# Patient Record
Sex: FEMALE | Race: WHITE | Employment: UNEMPLOYED | URBAN - METROPOLITAN AREA
[De-identification: names, ages, dates, MRNs, and addresses within clinical notes are randomized per-mention and may not be internally consistent; named-entity substitution may affect disease eponyms.]

---

## 2019-01-29 PROCEDURE — 87624 HPV HI-RISK TYP POOLED RSLT: CPT | Performed by: PHYSICIAN ASSISTANT

## 2019-01-29 PROCEDURE — 88175 CYTOPATH C/V AUTO FLUID REDO: CPT | Performed by: PHYSICIAN ASSISTANT

## 2025-04-24 RX ORDER — METOPROLOL SUCCINATE 25 MG/1
25 TABLET, EXTENDED RELEASE ORAL DAILY
Status: ON HOLD | COMMUNITY
End: 2025-04-30

## 2025-04-24 RX ORDER — LOSARTAN POTASSIUM 25 MG/1
25 TABLET ORAL DAILY
COMMUNITY

## 2025-04-24 NOTE — PAT NURSING NOTE
Per PAT encounter/MyChart message sent to pt/took notes as well in case doesn't cross into [place]:    PreOp Instructions     You are scheduled for: a Cardiac Procedure     Date of Procedure: 04/30/25 Wednesday     Diet Instructions: Do not eat or drink anything after midnight including gum, mints, candy, etc.     Medications: Medications you are allowed to take can be taken with a sip of water the morning of your procedure; Take Aspirin 81 mg x 4 tablets OR Aspirin 325 mg x 1 tablet the day of your procedure.      Medications to Stop: Hold herbal supplements and vitamins     Skin Prep : Shower with antibacterial soap using a clean washcloth, prior to procedure. Once dried off, no lotions/powders/creams/ointments, etc., Do not shave the procedure area, this will be completed at the hospital during the preparation phase.     Arrival Time: The day prior to your procedure (Tuesday) you will receive a phone call before 6:00 pm with your arrival time. If you haven't received a phone call, please check your voicemail messages., If you did not receive a voice mail and it is after 6:00 pm, please call the nursing supervisor at [phone].    Driving After Procedure: Sedation will be given so you WILL NOT be able to drive home. You will need a responsible adult  to drive you home. You can NOT take uber or taxi unless approved by your physician in advance.     Discharge Teaching: Your nurse will give you specific instructions before discharge, Most people can resume normal activities in 2-3 days, Any questions, please call the physician's office     Discharge Teaching (Overnight): We recommend you bring an overnight bag that you leave in the car the day of your procedure in case you need to stay overnight. We do not have lockers to lock up belongings so your  would have to hold onto bag while you are in the procedure. Examples would be chargers for electronics you need, toiletries, change of clothes, etc.      Booster parking is available starting at 6 am or park in the [place] parking garage at [facility]. Check in at the [facility] reception desk. Our  will be there to check you in for your procedure. Please bring your insurance cards and ID with you.                                                                                                                                      Please DO NOT respond to this message, the inbasket is not monitored for messages. For any questions, please call the physician's office.

## 2025-04-24 NOTE — H&P
[facility]   Cardiology Consultation    Reason for consult: Abnormal echo     HPI:   Initial visit 2025:  Katie Mabry is a very nice 54 year old female who presents for evaluation of abnormal echo showing LVEF 30%. Sick for 4 weeks in Dec 2024, SOB, asthma, headaches, treated for PNA at the time. There are no reports of chest pain, palpitations, leg swelling, orthopnea, PND, lightheadedness, syncope, claudication, stroke/TIA symptoms, or any outward signs of bleeding.     HISTORY:  Past Medical History:   Diagnosis Date   Extrinsic asthma, unspecified     Past Surgical History:   Procedure Laterality Date   BENIGN BIOPSY LEFT    benign excisional biopsy        Family History:   There is no family history of premature coronary artery disease or sudden cardiac death.   family history includes High Blood Pressure in her father; High Cholesterol in her father.   Social History:   reports that she has never smoked. She has never been exposed to tobacco smoke. She has never used smokeless tobacco. She reports that she does not drink alcohol and does not use drugs.     Allergies:    Cephalosporins RASH    Current Meds:  Current Outpatient Medications   Medication Sig Dispense Refill   Respiratory Therapy Supplies (NEBULIZER/TUBING/MOUTHPIECE) Does not apply Kit Use as needed with nebulizer solution. 1 kit 0   budesonide 0.25 MG/2ML Inhalation Suspension Take 2 mL (0.25 mg total) by nebulization 2 (two) times daily as needed (cough, wheezing). 30 mL 0   ciprofloxacin-hydrocortisone (CIPRO HC) 0.2-1 % Otic Suspension Place 3 drops into the left ear 2 (two) times daily. 3 gtts bid x 7 days 10 mL 0   VITAMIN D-VITAMIN K OR Take 1 tablet by mouth daily.   MAGNESIUM OR Take 1 tablet by mouth daily.       ROS:   As per HPI, otherwise 10 point ROS is negative in detail.     PHYSICAL EXAM:   /80  Pulse 120  Ht 5' 2\" (1.575 m)  Wt 220 lb (99.8 kg)  LMP 2023 (Approximate)  SpO2 96%   BMI 40.24 kg/m²     General: Awake and alert; in no acute distress  HEENT: Extraocular movements are intact; sclerae are anicteric  Neck: supple, no JVD, carotids brisk and equal  Cardiac: Regular rate and regular rhythm; normal S1 and S2, no murmurs, no rubs or gallops  Lungs: Clear to auscultation bilaterally; no rales, wheezing, rhonci; no accessory muscle use is noted  Abdomen: Soft, nondistended, nontender  Extremities: Warm, no clubbing or cyanosis; no edema  Psychiatric: Normal mood and affect; answers questions appropriately  Dermatologic: No rashes; normal skin turgor  Neurologic: No focal deficits    LABORATORY VALUES:  Cholesterol, Total (mg/dL)   Date Value   12/26/2013 122     Cholesterol (mg/dL)   Date Value   03/15/2025 146     HDL Cholesterol (mg/dL)   Date Value   12/26/2013 41     Direct HDL (mg/dL)   Date Value   03/15/2025 43     LDL Cholesterol Calc (mg/dL)   Date Value   12/26/2013 56     Calculated LDL (mg/dL)   Date Value   03/15/2025 71     Triglycerides (mg/dL)   Date Value   12/26/2013 123     AST (SGOT) (IU/L)   Date Value   12/26/2013 21     AST (U/L)   Date Value   03/15/2025 17     ALT (SGPT) (IU/L)   Date Value   12/26/2013 22     ALT (U/L)   Date Value   03/15/2025 21     Blood Urea Nitrogen (mg/dL)   Date Value   03/15/2025 14.0   12/26/2013 10     Creatinine, Serum (mg/dL)   Date Value   12/26/2013 0.60     Creatinine (mg/dL)   Date Value   03/15/2025 0.64     Cardiovascular diagnostics     Echo EF 25-30%  EKG Sinus tach 102 bpm, poss LAE, borderline ECG     ASSESSMENT:   Cardiomyopathy - LVEF 25-30%. No s/s HF at this time.   Viral syndrome 12/2024    PLAN:   Discussed test results and options for management including medical therapy alone or coronary angiography, either with CT or cardiac catheterization. Given severely low EF, we have mutually agreed to proceed with cardiac catheterization and coronary angiography for definitive evaluation.  Start metoprolol, losartan and  introduce further GDMT as able  If LVEF remains < 36% will need ICD. Consider Lifevest pending cath LV angio in the next few days.     Informed consent  Risks and benefits of procedure were discussed with patient. Airway examined. Patient is ASA class 2 and Mallampati class 2. Pt is appropriate for conscious sedation. No history of difficult airway.    The risks, benefits, indications and alternatives of left heart catheterization and coronary angigoraphy with possible percutaneous coronary intervention were discussed. The risks include, but are not limited to: bleeding, anemia requiring blood transfusion, allergic reaction, hypotension, infection, vascular injury, injury to upper or lower extremity requiring endovascular or open surgical repair, kidney failure, stroke, cardiac or pulmonary artery perforation, pericardial effusion and tamponade requiring pericardiocentesis, myocardial infarction (heart attack), respiratory failure needing intubation, cardiac arrest, need for emergent surgery, and death. Benefits of the procedure include: symptomatic improvement, diagnosis of coronary artery disease or other forms of heart disease and possibly prevention of myocardial infarction. Alternatives to the procedure include: not performing cardiac catheterization, treatment with medications only, and observation. The patient and any accompanying family have considered the above, all questions have been answered to the best of my ability to their satisfaction, and have decided to proceed with the procedure.     Appropriate candidate for Sedation/Analgesia: Yes  Plan for Sedation reviewed: Yes   Explained Anesthesia options and attendant risks, and have determined patient is an appropriate candidate. Yes  Consent for Sedation obtained: Yes  Patient reevaluated immediately prior to Sedation/Analgesia: Yes     Patient is signed up for MyChart and agrees to follow up through this for results.   RTC post cath    60 minutes spent  reviewing chart, history, patient symptoms, underlying risk factors, medical conditions, diagnosis, formulation of treatment plan, and initiation of medical therapy.    Ken Elizabeth MD  Interventional Cardiology  [facility]  Office: [phone]

## 2025-04-30 ENCOUNTER — HOSPITAL ENCOUNTER (OUTPATIENT)
Dept: INTERVENTIONAL RADIOLOGY/VASCULAR | Facility: HOSPITAL | Age: 55
Discharge: HOME | End: 2025-04-30
Attending: INTERNAL MEDICINE | Admitting: INTERNAL MEDICINE
Payer: COMMERCIAL

## 2025-04-30 VITALS
WEIGHT: 220 LBS | DIASTOLIC BLOOD PRESSURE: 85 MMHG | HEART RATE: 90 BPM | TEMPERATURE: 98 F | SYSTOLIC BLOOD PRESSURE: 129 MMHG | HEIGHT: 62 IN | BODY MASS INDEX: 40.48 KG/M2 | RESPIRATION RATE: 15 BRPM | OXYGEN SATURATION: 97 %

## 2025-04-30 DIAGNOSIS — R93.1 ABNORMAL ECHOCARDIOGRAM: ICD-10-CM

## 2025-04-30 PROCEDURE — 99152 MOD SED SAME PHYS/QHP 5/>YRS: CPT | Performed by: INTERNAL MEDICINE

## 2025-04-30 PROCEDURE — 93458 L HRT ARTERY/VENTRICLE ANGIO: CPT | Performed by: INTERNAL MEDICINE

## 2025-04-30 RX ORDER — VERAPAMIL HYDROCHLORIDE 2.5 MG/ML
INJECTION INTRAVENOUS
Status: COMPLETED
Start: 2025-04-30 | End: 2025-04-30

## 2025-04-30 RX ORDER — METOPROLOL SUCCINATE 50 MG/1
50 TABLET, EXTENDED RELEASE ORAL DAILY
Qty: 90 TABLET | Refills: 1 | Status: SHIPPED | OUTPATIENT
Start: 2025-04-30

## 2025-04-30 RX ORDER — HEPARIN SODIUM 5000 [USP'U]/ML
INJECTION, SOLUTION INTRAVENOUS; SUBCUTANEOUS
Status: COMPLETED
Start: 2025-04-30 | End: 2025-04-30

## 2025-04-30 RX ORDER — LIDOCAINE HYDROCHLORIDE 10 MG/ML
INJECTION, SOLUTION EPIDURAL; INFILTRATION; INTRACAUDAL; PERINEURAL
Status: COMPLETED
Start: 2025-04-30 | End: 2025-04-30

## 2025-04-30 RX ORDER — SODIUM CHLORIDE 9 MG/ML
INJECTION, SOLUTION INTRAVENOUS
Status: DISCONTINUED | OUTPATIENT
Start: 2025-05-01 | End: 2025-04-30 | Stop reason: HOSPADM

## 2025-04-30 RX ORDER — ASPIRIN 81 MG/1
324 TABLET ORAL ONCE
COMMUNITY

## 2025-04-30 RX ORDER — IOPAMIDOL 755 MG/ML
100 INJECTION, SOLUTION INTRAVASCULAR
Status: COMPLETED | OUTPATIENT
Start: 2025-04-30 | End: 2025-04-30

## 2025-04-30 RX ORDER — MIDAZOLAM HYDROCHLORIDE 1 MG/ML
INJECTION INTRAMUSCULAR; INTRAVENOUS
Status: COMPLETED
Start: 2025-04-30 | End: 2025-04-30

## 2025-04-30 RX ORDER — NITROGLYCERIN 20 MG/100ML
INJECTION INTRAVENOUS
Status: COMPLETED
Start: 2025-04-30 | End: 2025-04-30

## 2025-04-30 RX ADMIN — IOPAMIDOL 65 ML: 755 INJECTION, SOLUTION INTRAVASCULAR at 14:12:00

## 2025-04-30 NOTE — DISCHARGE INSTRUCTIONS
HOME CARE INSTRUCTIONS FOLLOWING CORONARY ANGIOGRAPHY,  PERIPHERAL ANGIOGRAPHY, ANGIOPLASTY (PTCA/PTA) OR INSERTION  OF STENT IN THE CORONARY, CAROTID, AND/OR PERIPHERAL ARTERIES      Activity:   DO NOT drive after the procedure. You may resume driving late the following day according to the nurse or physician’s instructions   Plan on resting and relaxing tonight and tomorrow   Resume your normal activity after 48 hours, or as instructed by your physician   Do not lift anything over 10 pounds for the next 24 hours   Avoid sexual activity for the next 24 hours   Avoid drinking alcohol for the next 24 hours   If the wrist was used, avoid bending/flexing of the wrist for the next 24 hours.       What is Normal?   A small lump at the procedure site associated with mild tenderness when touched   The procedure site may be bruised or discolored   There may be a small amount of drainage on the bandage    Special Instructions:   Drink plenty of fluids during the next 24 hours to “flush” the contrast from your system   The bandage is to remain in place for 24 hours   Keep the bandage clean and dry   DO NOT submerge the procedure site for 72 hours (no bath tubs or pools). This includes dishwashing/submersion of the wrist, if the wrist was used   After 24 hours, you must remove the bandage   You should shower after removing the bandage, and wash the procedure site gently with soap and water   If you choose to wear a bandage for a few days, make sure it remains clean and dry and that it is changed daily    When you should NOTIFY YOUR PHYSICIAN:   Bleeding can occur at the procedure site - both on the outside of the skin and/or beneath the surface of the skin   Swelling or a large lump at the procedure site can occur, which may be accompanied by moderate to severe pain. If either of the above occurs, lie down flat. Have someone apply pressure to the procedure site with both hands, as instructed by the nurse. Hold pressure for 20  minutes and the bleeding should stop. Notify your physician of the occurrence. If the bleeding does not stop, call 911 and continue to apply pressure   If you experience signs of a fever, temperature >101 degrees, chills, infection (redness, swelling, thick yellow drainage, or a foul odor from the procedure site)   If you notice any numbness, tingling, or loss of feeling to your fingers or hand, if wrist access was utilized    Other:  - You may resume your present diet, unless otherwise specified by your physician.   - You may resume all of your medications as prescribed, unless otherwise directed by your physician. A list of your medications was provided to you at discharge.  - Please call your physician's office for a follow-up appointment. You should be seen in 2 weeks.  - Do not make any personal/business decisions and/or sign any legal documents for the next 24 hours.

## 2025-04-30 NOTE — PROGRESS NOTES
Pt s/p LHC via right radial artery. Pt received from cath lab with right radial TR band in place. Right wrist is soft with no evidence of bleeding, palpable right radial pulse. VSS. Pt awake and tolerating PO. TR band removed per protocol, wrist remains soft with no bleeding, occlusive dressing applied. After recovery completed, discharge instructions reviewed with patient, copy given and all questions answered. IV removed. Pt discharged via wheelchair to [facility],  to drive patient home.

## 2025-05-01 NOTE — PROCEDURES
OPERATIVE REPORT - CARDIAC CATHETERIZATION    Date of Procedure: 4/30/2025     Performing Physician: Ken Elizabeth MD    Pre-Procedure Diagnosis:   Cardiomyopathy  Systolic HF    Post-Procedure Diagnosis:  Same as pre     Findings:  Left main: No stenosis  LAD: No stenosis  Left circumflex: No stenosis  RCA: Dominant to PDA. No stenosis  LV: EF ~ 45%, no RWMA, No sig MR. LVEDP < 10 mmHg. No LV-Ao gradient on pullback.     Conclusions / Recommendations:  No significant CAD. Etiology of cardiomyopathy is nonischemic. May be related to viral syndrome in 12/2024. LVEF appears to be improved since last echo with normal filling pressure.   Remove the TR band per protocol  Post cath IVF  Optimize GDMT. Increase Toprol to 50mg daily given HR 80-90s and continue losartan.     -----------------------    Procedures performed:   1. Left heart catheterization  2. Selective bilateral coronary angiography  3. Moderate sedation  4. Access of right radial artery    Indications: This is a 54 year old female presenting for left heart catheterization with coronary angiography to evaluate for obstructive CAD.     Description of Procedure: Informed consent was obtained from the patient in writing. The risks and benefits of the procedure were reviewed in detail with the patient, including but not limited to the risk of myocardial infarction, stroke, renal failure, bleeding, and death. After a thorough discussion of these risks and benefits, the patient agreed to proceed and signed an informed consent document. The patient was brought to the cardiac catheterization laboratory in the fasting state. Moderate sedation was employed using a total of IV Versed 3 mg and IV fentanyl 75 mcg in divided doses.  I directly observed the patient from 1354 to 1409, and an independent trained observer was present and assisted in the monitoring of the patient's level of consciousness and physiological status, heart rate, blood pressure, oximetry, and  rhythm. All operators present for the case performed standard pre-procedural prep including hand washing, sterile gloves, gown, mask, and cap. All aspects of the maximum sterile barrier technique were followed. A preprocedural time out was performed with all physicians, technologists, and nurses involved with the procedure. 1% lidocaine was infiltrated subcutaneously in the right wrist for local anesthesia. Ultrasound guided access was obtained in the right radial artery with a 5/6F Slender Glidesheath using the Seldinger technique and a micropuncture needle with a single anterior wall puncture. Standard vasodilator cocktail was given with heparin 5000 units, nitroglycerin 200 mcg and verapamil 2.5 mg through the arterial sheath. A 6F TIG catheter was advanced over a J tipped wire through the arterial sheath and placed in the aortic root, then used to selectively engage the left coronary artery. Standard angiographic views were taken in orthogonal projections. The RCA was then engaged and standard angiographic views were performed. The catheter was then prolapsed into the LV over a wire and placed at the base of the LV. LV systolic and end diastolic pressure was then recorded. The catheter was pulled back and pullback measurements of LV and aortic pressure and recorded. The catheter was then removed over a wire. At the conclusion of the procedure, all catheters and wires were removed over a wire. The arterial sheath was removed and hemostasis achieved with standard TR band using patent hemostasis technique. There was no bleeding or hematoma. The patient tolerated the procedure well without complication. EBL <10 mL. Total contrast ~ 70 mL. See procedure log for fluoro time and radiation dose.      Ken Elizabeth MD  Interventional Cardiology  [facility]